# Patient Record
Sex: FEMALE | Race: WHITE | Employment: OTHER | URBAN - METROPOLITAN AREA
[De-identification: names, ages, dates, MRNs, and addresses within clinical notes are randomized per-mention and may not be internally consistent; named-entity substitution may affect disease eponyms.]

---

## 2017-01-26 ENCOUNTER — HOSPITAL ENCOUNTER (OUTPATIENT)
Dept: PHYSICAL THERAPY | Age: 82
Discharge: HOME | End: 2017-01-26
Payer: MEDICARE

## 2017-01-26 PROCEDURE — 97161 PT EVAL LOW COMPLEX 20 MIN: CPT

## 2017-01-26 PROCEDURE — 97110 THERAPEUTIC EXERCISES: CPT

## 2017-01-26 PROCEDURE — 97140 MANUAL THERAPY 1/> REGIONS: CPT

## 2017-01-26 PROCEDURE — G8988 SELF CARE GOAL STATUS: HCPCS

## 2017-01-26 PROCEDURE — G8987 SELF CARE CURRENT STATUS: HCPCS

## 2017-01-26 NOTE — PROGRESS NOTES
Cee Rosas 31  [facility]  [address], [address], [place], [address] [facility] - Phone: [phone]  Fax: [phone] / [address] [facility]  Patient Name: Mayelin Rg :    Treatment   Diagnosis: (R) TMD - anterior disc dysfunction Medical   Diagnosis: TMJ (dislocation of temporomandibular joint) [S03.00XA]   Onset Date: 2016     Referral Source: Iona Alberto MD [facility]): 2017   Prior Hospitalization: See medical history Provider #: [id number]   Prior Level of Function: No difficulty eating, chewing various foods, normal diet   Comorbidities: OA, skin cancer, D and C, breast reduction     Medications: Verified on Patient Summary List   The Plan of Care and following information is based on the information from the initial evaluation.   ==================================================================================  Assessment / key information:  Pt is a 79 yo female s/p increase in (R) TMJ s/s after dental procedure perform 2016 . He/she presents with pain ranging from 0-10/10, located (R) TMJ. Pain is made worse with opening mouth, yawning, chewing food, better with rest, meds. C/S AROM: flexion 40 deg, extension 25 deg,, LRotation 60 deg RRotation 60 deg. Palpation reveals TTP increased TTP to (R) temporalis, masseter, (R) TMD. Mandibular Depression 5mm, Lateral Deviation (L) 3 mm, (R) 2 mm. Sensation is intact to light touch. Decreased inferior and anterior translation of (R) mandibular condyle. Pt will benefit from PT interventions to address the aforementioned deficits and allow pt to return to PLOF.   Eval Complexity: History MEDIUM  Complexity : 1-2 comorbidities / personal factors will impact the outcome/ POC ;  Examination  LOW Complexity : 1-2 Standardized tests and measures addressing body structure, function, activity limitation and / or participation in recreation ; Presentation LOW Complexity : Stable, uncomplicated ;  Decision Making MEDIUM Complexity : FOTO score of 26-74; Overall Complexity LOW     ==================================================================================  Problem List: pain affecting function, decrease ROM and decrease flexibility/ joint mobility   Treatment Plan may include any combination of the following: Therapeutic exercise, Neuromuscular re-education, Physical agent/modality, Manual therapy and Patient education  Patient / Family readiness to learn indicated by: asking questions, trying to perform skills and interest  Persons(s) to be included in education: patient (P)  Barriers to Learning/Limitations: no  Measures taken:    Patient Goal (s): Decrease pain, increase ability to eat a variety of food   Patient self reported health status: good  Rehabilitation Potential: good   Short Term Goals: To be accomplished in  2  weeks:  1. Pt will be independent and compliant with HEP to decrease pain, increase ROM and return pt to PLOF. 2. Pt will note pain less than or equal to 5/10 at worst to allow increase in functional abilities. 3. Pt will demonstrate mandibular depression by 10mm to allow ability to chew foods    Long Term Goals: To be accomplished in  4  weeks:  1. Increase score on FOTO by > or = 12 points to demo increase in functional activities. 2. Pt will havepain-free AROM mandibular depression greater than 30mm to allow ability to return to normal diet  3. Pt will note < or = 2/10 pain with all mobility to improve comfort with ADLs. Frequency / Duration:   Patient to be seen  2  times per week for 4  weeks:  Patient / Caregiver education and instruction: self care, activity modification and exercises  G-Codes (GP): Self Care  Current  CK= 40-59%   Goal  CK= 40-59%.   The severity rating is based on the FOTO Score     Therapist Signature: Christian Yancey DPT, CIMT Date: 1/26/2017 Certification Period: 1/26/17-3/21/17 Time: 3:06 PM   ===========================================================================================  I certify that the above Physical Therapy Services are being furnished while the patient is under my care. I agree with the treatment plan and certify that this therapy is necessary. Physician Signature:        Date:       Time:     Please sign and return to [facility] or you may fax the signed copy to [phone]. Thank you.

## 2017-01-26 NOTE — PROGRESS NOTES
PHYSICAL THERAPY - DAILY TREATMENT NOTE    Patient Name: Alex Vasquez        Date: 2017  : 1929   YES Patient  Verified  Visit #:   1   of   8  Insurance: Payor: [place] MED[place]ARE / Plan: [place] MEDICARE PART A & B / Product Type: Medicare /      In time: 215 Out time: 300   Total Treatment Time: 45     Medicare Time Tracking (below)   Total Timed Codes (min):  45 1:1 Treatment Time:  45     TREATMENT AREA =  (R) TMJ - anterior disc dislocation    SUBJECTIVE    Pain Level (on 0 to 10 scale):  ie  / 10   Medication Changes/New allergies or changes in medical history, any new surgeries or procedures? NO    If yes, update Summary List   Subjective Functional Status/Changes:  []  No changes reported     See POC          OBJECTIVE  20 mins Manual therapy  - DTM to (R) temporalis, massester, contract relax for mandibular depression, (R) TMJ distraction anterior translation mobs    min Patient Education:  YES  Reviewed HEP   []  Progressed/Changed HEP based on: Other Objective/Functional Measures:    Shown and Performed HEP.      Post Treatment Pain Level (on 0 to 10) scale:   ie  / 10     ASSESSMENT    []  See Progress Note/Recertification   Patient will continue to benefit from skilled therapy to address remaining functional deficits: See POC   Progress toward goals / Updated goals:    See POC     PLAN    []  Upgrade activities as tolerated YES Continue plan of care   []  Discharge due to :    []  Other:      Therapist: Kalee Grover DPT, CIMT    Date: 2017 Time: 3:04 PM

## 2017-02-03 ENCOUNTER — HOSPITAL ENCOUNTER (OUTPATIENT)
Dept: PHYSICAL THERAPY | Age: 82
Discharge: HOME | End: 2017-02-03
Payer: MEDICARE

## 2017-02-03 PROCEDURE — 97110 THERAPEUTIC EXERCISES: CPT

## 2017-02-03 PROCEDURE — 97140 MANUAL THERAPY 1/> REGIONS: CPT

## 2017-02-03 NOTE — PROGRESS NOTES
PHYSICAL THERAPY - DAILY TREATMENT NOTE    Patient Name: Kaushik Yeboah        Date: 2/3/2017  : 1929   YES Patient  Verified  Visit #:   2   of   12  Insurance: Payor: [place] MEDICARE / Plan: VA MEDICARE PART A & B / Product Type: Medicare /      In time: 230 Out time: 305   Total Treatment Time: 35       TREATMENT AREA = TMJ (dislocation of temporomandibular joint) [S03.00XA]    SUBJECTIVE  Pain Level (on 0 to 10 scale):  0  / 10   Medication Changes/New allergies or changes in medical history, any new surgeries or procedures? NO    If yes, update Summary List   Subjective Functional Status/Changes:  []  No changes reported     Reports compliance with HEP for TMJ s/s          OBJECTIVE  Modalities Rationale:      to improve patient's ability to    min [] Estim, type/location:                                      []  att     []  unatt     []  w/US     []  w/ice    []  w/heat    min []  Mechanical Traction: type/lbs                   []  pro   []  sup   []  int   []  cont    []  before manual    []  after manual    min []  Ultrasound, settings/location:      min []  Iontophoresis w/ dexamethasone, location:                                               []  take home patch       []  in clinic    min []  Ice     []  Heat    location/position:     min []  Vasopneumatic Device, press/temp:     min []  Other:    [] Skin assessment post-treatment (if applicable):    []  intact    []  redness- no adverse reaction     []redness  adverse reaction:        10 min Therapeutic Exercise:  [x]  See flow sheet   Rationale:      increase ROM to improve the patients ability to chew food     25 min Manual Therapy: DTM to (R) temporalis, masseter, TMJ distraction tech, MFR to (R) temporomandibular region    Rationale:      decrease pain, increase ROM and increase tissue extensibility to improve patient's ability to chew food     min Patient Education:  YES  Reviewed HEP   []  Progressed/Changed HEP based on:         Other Objective/Functional Measures:    Demonstrates decrease mandibular depression to 3 mm at onset of visit with increase tonicity in (R) temporalis and masseter region    Able to perform 10mm of mandibular depression     Post Treatment Pain Level (on 0 to 10) scale:   0  / 10     ASSESSMENT  Assessment/Changes in Function:     Progression with mandibular depression and with decrease in (R) jaw s/s     []  See Progress Note/Recertification   Patient will continue to benefit from skilled PT services to modify and progress therapeutic interventions, address functional mobility deficits, address ROM deficits, address strength deficits and analyze and address soft tissue restrictions to attain remaining goals.    Progress toward goals / Updated goals:    Progression towards goals, will monitor to response to manual therapy next session     PLAN  []  Upgrade activities as tolerated YES Continue plan of care   []  Discharge due to :    []  Other:      Therapist: Jorje Louise DPT, CIMT    Date: 2/3/2017 Time: 3:16 PM       Future Appointments  Date Time Provider Lynne Sinclair   2/7/2017 2:00 PM Min Eugene, PT [facility]   2/9/2017 2:00 PM Min Eugene, PT [facility]   2/14/2017 2:00 PM Min Eugene, PT [facility]   2/16/2017 2:00 PM Min Eugene, PT [facility]   2/21/2017 2:30 PM Min Eugene, PT [facility]   2/23/2017 2:00 PM Min Eugene, PT [facility]

## 2017-02-07 ENCOUNTER — HOSPITAL ENCOUNTER (OUTPATIENT)
Dept: PHYSICAL THERAPY | Age: 82
Discharge: HOME | End: 2017-02-07
Payer: MEDICARE

## 2017-02-07 PROCEDURE — 97140 MANUAL THERAPY 1/> REGIONS: CPT

## 2017-02-07 PROCEDURE — 97110 THERAPEUTIC EXERCISES: CPT

## 2017-02-07 NOTE — PROGRESS NOTES
PHYSICAL THERAPY - DAILY TREATMENT NOTE    Patient Name: Cherelle Sexton        Date: 2017  : 1929   YES Patient  Verified  Visit #:   3   of   12  Insurance: Payor: Breann Ice / Plan: VA MEDICARE PART A & B / Product Type: Medicare /      In time: 200 Out time: 240   Total Treatment Time: 40     Medicare Time Tracking (below)   Total Timed Codes (min):  40 1:1 Treatment Time:  40     TREATMENT AREA = TMJ (dislocation of temporomandibular joint) [S03.00XA]    SUBJECTIVE  Pain Level (on 0 to 10 scale):  0  / 10   Medication Changes/New allergies or changes in medical history, any new surgeries or procedures?     NO    If yes, update Summary List   Subjective Functional Status/Changes:  []  No changes reported     Reports soreness (R) TMJ region at onset of visit, Reports feeling stiffness           OBJECTIVE    10 min Therapeutic Exercise:  [x]  See flow sheet   Rationale:      increase ROM to improve the patients ability to chew food     30 min Manual Therapy: DTM to (R) masseter, (R) temporalis, TMJ distraction technique, inferior/anterior glide, DTM to (R) medial pyterygoid   Rationale:      decrease pain, increase ROM and increase tissue extensibility to improve patient's ability to increase ability to chew food    Other Objective/Functional Measures:    Demonstrates soreness in (R) TMJ reigon with limited mandibular depression to 3mm at onset of visit     Post Treatment Pain Level (on 0 to 10) scale:   0  / 10     ASSESSMENT  Assessment/Changes in Function:     Slow progression with mandibular depression, able to demonstrates 10mm at end of treatment session      []  See Progress Note/Recertification   Patient will continue to benefit from skilled PT services to modify and progress therapeutic interventions, address functional mobility deficits, address ROM deficits, address strength deficits, analyze and address soft tissue restrictions and analyze and cue movement patterns to attain remaining goals.   Progress toward goals / Updated goals:    Progressing slowly with overall mobility will monitor and progress as able      PLAN  []  Upgrade activities as tolerated YES Continue plan of care   []  Discharge due to :    []  Other:      Therapist: Tammy Will DPT, CIMT    Date: 2/7/2017 Time: 1:34 PM       Future Appointments  Date Time Provider Lynne Sinclair   2/7/2017 2:00 PM Matthieu Blind, PT [facility]   2/9/2017 2:00 PM Matthieu Blind, PT [facility]   2/14/2017 2:00 PM Matthieu Blind, PT [facility]   2/16/2017 2:00 PM Matthieu Blind, PT [facility]   2/21/2017 2:30 PM Matthieu Blind, PT [facility]   2/23/2017 2:00 PM Matthieu Blind, PT [facility]

## 2017-02-09 ENCOUNTER — HOSPITAL ENCOUNTER (OUTPATIENT)
Dept: PHYSICAL THERAPY | Age: 82
Discharge: HOME | End: 2017-02-09
Payer: MEDICARE

## 2017-02-09 PROCEDURE — 97124 MASSAGE THERAPY: CPT

## 2017-02-09 PROCEDURE — 97140 MANUAL THERAPY 1/> REGIONS: CPT

## 2017-02-09 NOTE — PROGRESS NOTES
PHYSICAL THERAPY - DAILY TREATMENT NOTE    Patient Name: Mindy Miner        Date: 2017  : 1929   YES Patient  Verified  Visit #:   4   of   12  Insurance: Payor: Roberto Many / Plan: [place] MEDICARE PART A & B / Product Type: Medicare /      In time: 200 Out time: 230   Total Treatment Time: 30     Medicare Time Tracking (below)   Total Timed Codes (min):  30 1:1 Treatment Time:  30     TREATMENT AREA = TMJ (dislocation of temporomandibular joint) [S03.00XA]    SUBJECTIVE  Pain Level (on 0 to 10 scale):  0  / 10   Medication Changes/New allergies or changes in medical history, any new surgeries or procedures? NO    If yes, update Summary List   Subjective Functional Status/Changes:  []  No changes reported     Reports decrease in stiffness after last visit, mild reports of crepitus with mandibular protrusion exercises           OBJECTIVE    [] Skin assessment post-treatment (if applicable):    []  intact    []  redness- no adverse reaction     []redness  adverse reaction:        30 min Manual Therapy: DTM to (R) masseter, MFR to (R) TMD, f/b intraoral release of (R) medial pterygoid, joint mobs for inferior glide, anterior translation of (R) TMD   Rationale:      decrease pain, increase ROM and increase tissue extensibility to improve patient's ability to perform ADLs     min Patient Education:  YES  Reviewed HEP   []  Progressed/Changed HEP based on: Other Objective/Functional Measures:    Cavitation noted with use of inferior glide, anterior translation mob this visit, demonstrates 5mm of mandibular depression at onset of visit, able to perform 15mm after manual therapy     Post Treatment Pain Level (on 0 to 10) scale:   0  / 10     ASSESSMENT  Assessment/Changes in Function:     Progressing with overall mandibular depression.  Pt advised to con't with mandibular depression, lateral deviation and protrusion exercises over weekend     []  See Progress Note/Recertification   Patient will continue to benefit from skilled PT services to modify and progress therapeutic interventions, address functional mobility deficits, address ROM deficits and address strength deficits to attain remaining goals. Progress toward goals / Updated goals:     Will monitor and progress as able      PLAN  []  Upgrade activities as tolerated YES Continue plan of care   []  Discharge due to :    []  Other:      Therapist: Jorje Louise DPT, CIMT    Date: 2/9/2017 Time: 2:34 PM       Future Appointments  Date Time Provider Lynne Sinclair   2/14/2017 2:00 PM Min Eugene, PT [facility]   2/16/2017 2:00 PM Min Eugene, PT [facility]   2/21/2017 2:30 PM Min Eugene, PT [facility]   2/23/2017 2:00 PM Min Eugene, PT [facility]

## 2017-02-14 ENCOUNTER — HOSPITAL ENCOUNTER (OUTPATIENT)
Dept: PHYSICAL THERAPY | Age: 82
Discharge: HOME | End: 2017-02-14
Payer: MEDICARE

## 2017-02-14 PROCEDURE — 97140 MANUAL THERAPY 1/> REGIONS: CPT

## 2017-02-14 PROCEDURE — 97110 THERAPEUTIC EXERCISES: CPT

## 2017-02-14 NOTE — PROGRESS NOTES
PHYSICAL THERAPY - DAILY TREATMENT NOTE    Patient Name: Ricky Lares        Date: 2017  : 1929   YES Patient  Verified  Visit #:     Insurance: Payor: Deja Smalls / Plan: VA MEDICARE PART A & B / Product Type: Medicare /      In time: 200 Out time: 230   Total Treatment Time: 30     Medicare Time Tracking (below)   Total Timed Codes (min):  30 1:1 Treatment Time:  30     TREATMENT AREA = TMJ (dislocation of temporomandibular joint) [S03.00XA]    SUBJECTIVE  Pain Level (on 0 to 10 scale):  0  / 10   Medication Changes/New allergies or changes in medical history, any new surgeries or procedures? NO    If yes, update Summary List   Subjective Functional Status/Changes:  []  No changes reported   Reports reduction in pain levels with mild reports of clicking since last session with use of HEP, states she has been able to each a variety of food type since last session including chicken and veal - previously unable to eat these foods due to limited opening         OBJECTIVE    30 min Manual Therapy: DTM to (R) masseter, medial pterygoid, TMJ distraction mobs, inferior glide with anterior translation mobs   Rationale:      decrease pain, increase ROM and increase tissue extensibility to improve patient's ability to perform ADLs     min Patient Education:  YES  Reviewed HEP   []  Progressed/Changed HEP based on:         Other Objective/Functional Measures:    Demonstrates 5mm of mandibular depression at onset of visit, with significant hypertonicity in (R) medial pterygoid and masseter    Demonstrates 12mm of mandibular depression at manual therapy techniques      Post Treatment Pain Level (on 0 to 10) scale:   0  / 10     ASSESSMENT  Assessment/Changes in Function:     Required verbal cues for use of exercise to decrease lingual compensations and perform techniques appropriately     []  See Progress Note/Recertification   Patient will continue to benefit from skilled PT services to modify and progress therapeutic interventions, address functional mobility deficits, address ROM deficits, address strength deficits, analyze and address soft tissue restrictions and analyze and cue movement patterns to attain remaining goals. Progress toward goals / Updated goals:    Progressing with overall function.  Will monitor and progress as able      PLAN  []  Upgrade activities as tolerated YES Continue plan of care   []  Discharge due to :    []  Other:      Therapist: Shasha Montgomery DPT, CIMT    Date: 2/14/2017 Time: 2:42 PM       Future Appointments  Date Time Provider Lynne Sinclair   2/16/2017 2:00 PM Erick Miu, PT [facility]   2/21/2017 2:30 PM Erick Miu, PT [facility]   2/23/2017 2:00 PM Erick Miu, PT [facility]

## 2017-02-16 ENCOUNTER — HOSPITAL ENCOUNTER (OUTPATIENT)
Dept: PHYSICAL THERAPY | Age: 82
Discharge: HOME | End: 2017-02-16
Payer: MEDICARE

## 2017-02-16 PROCEDURE — 97140 MANUAL THERAPY 1/> REGIONS: CPT

## 2017-02-16 NOTE — PROGRESS NOTES
PHYSICAL THERAPY - DAILY TREATMENT NOTE    Patient Name: Joy Aponte        Date: 2017  : 1929   YES Patient  Verified  Visit #:   6   of   12  Insurance: Payor: Rakesh Signs / Plan: [place] MEDICARE PART A & B / Product Type: Medicare /      In time: 200 Out time: 230   Total Treatment Time: 30     Medicare Time Tracking (below)   Total Timed Codes (min):  30 1:1 Treatment Time:  30     TREATMENT AREA = TMJ (dislocation of temporomandibular joint) [S03.00XA]    SUBJECTIVE  Pain Level (on 0 to 10 scale):  0  / 10   Medication Changes/New allergies or changes in medical history, any new surgeries or procedures?     NO    If yes, update Summary List   Subjective Functional Status/Changes:  []  No changes reported     Reports soreness in (R) TMD region with limited mandibular depression           OBJECTIVE  Modalities Rationale:      to improve patient's ability to    min [] Estim, type/location:                                      []  att     []  unatt     []  w/US     []  w/ice    []  w/heat    min []  Mechanical Traction: type/lbs                   []  pro   []  sup   []  int   []  cont    []  before manual    []  after manual    min []  Ultrasound, settings/location:      min []  Iontophoresis w/ dexamethasone, location:                                               []  take home patch       []  in clinic    min []  Ice     []  Heat    location/position:     min []  Vasopneumatic Device, press/temp:     min []  Other:    [] Skin assessment post-treatment (if applicable):    []  intact    []  redness- no adverse reaction     []redness  adverse reaction:        30 Min Manual Therapy: DTM to (R) temporalis, masseter, medial pterygoid, MFR to (R) TMD, (R) inferior glides to (R) TMD, anterior glide for mandibular depression   Rationale:      decrease pain, increase ROM and increase tissue extensibility to improve patient's ability to perform ADLs     min Patient Education:  YES  Reviewed HEP   [] Progressed/Changed HEP based on: Other Objective/Functional Measures:    Reports soreness with palpation to (R) masseter, medial pterygoid, temporalis, demonstrates limited mandibular depression to 3mm increase to 15 mm with distraction and inferior glide of TMD     Post Treatment Pain Level (on 0 to 10) scale:   0  / 10     ASSESSMENT  Assessment/Changes in Function:     Con't with limited opening of mouth with decrease in mandibular depression     []  See Progress Note/Recertification   Patient will continue to benefit from skilled PT services to modify and progress therapeutic interventions, address functional mobility deficits, address ROM deficits, address strength deficits and analyze and address soft tissue restrictions to attain remaining goals.    Progress toward goals / Updated goals:    Progressing slowly with mandibular depression, reassess FOTO next visit      PLAN  []  Upgrade activities as tolerated YES Continue plan of care   []  Discharge due to :    []  Other:      Therapist: Dena Alejandre DPT, CIMT    Date: 2/16/2017 Time: 2:38 PM       Future Appointments  Date Time Provider Lynne Sinclair   2/21/2017 2:30 PM Roberto Gao PT [address]   2/23/2017 2:00 PM Roberto Gao PT [address]

## 2017-02-21 ENCOUNTER — HOSPITAL ENCOUNTER (OUTPATIENT)
Dept: PHYSICAL THERAPY | Age: 82
Discharge: HOME | End: 2017-02-21
Payer: MEDICARE

## 2017-02-21 PROCEDURE — 97140 MANUAL THERAPY 1/> REGIONS: CPT

## 2017-02-21 NOTE — PROGRESS NOTES
PHYSICAL THERAPY - DAILY TREATMENT NOTE    Patient Name: Nora Ha        Date: 2017  : 1929   YES Patient  Verified  Visit #:     Insurance: Payor: [place] MEDICARE / Plan: VA MEDICARE PART A & B / Product Type: Medicare /      In time: 215 Out time: 250   Total Treatment Time: 35     Medicare Time Tracking (below)   Total Timed Codes (min):  35 1:1 Treatment Time:  35     TREATMENT AREA = TMJ (dislocation of temporomandibular joint) [S03.00XA]    SUBJECTIVE  Pain Level (on 0 to 10 scale):  0  / 10   Medication Changes/New allergies or changes in medical history, any new surgeries or procedures? NO    If yes, update Summary List   Subjective Functional Status/Changes:  []  No changes reported     Reports con't difficulty with opening jaw during ADL and meals          OBJECTIVE    35 min Manual Therapy: DTM to (R) masseter, temporalis, mandibular traction/distraction techniques, f/b anterior inferior glide for mandibular depression   Rationale:      decrease pain, increase ROM and increase tissue extensibility to improve patient's ability to perform ADLs     min Patient Education:  YES  Reviewed HEP   []  Progressed/Changed HEP based on: Other Objective/Functional Measures:    Con't with hypertonicity in (R) masseter, temporalis, decreased mandibular depression to 5mm at onset or visit, able to increase to 12mm at end of treatment session, with con't hypertonicity      Post Treatment Pain Level (on 0 to 10) scale:   0  / 10     ASSESSMENT  Assessment/Changes in Function:     Progressing slowly with mandibular depression      []  See Progress Note/Recertification   Patient will continue to benefit from skilled PT services to modify and progress therapeutic interventions, address functional mobility deficits, address ROM deficits, address strength deficits and analyze and address soft tissue restrictions to attain remaining goals.    Progress toward goals / Updated goals:    No changes towards goals this visit       PLAN  []  Upgrade activities as tolerated YES Continue plan of care   []  Discharge due to :    []  Other:      Therapist: Terrance Otoole DPT, CIMT    Date: 2/21/2017 Time: 2:53 PM       Future Appointments  Date Time Provider Lynne Sinclair   2/23/2017 2:00 PM Sena Massey, PT [facility]

## 2017-02-23 ENCOUNTER — HOSPITAL ENCOUNTER (OUTPATIENT)
Dept: PHYSICAL THERAPY | Age: 82
Discharge: HOME | End: 2017-02-23
Payer: MEDICARE

## 2017-02-23 PROCEDURE — 97140 MANUAL THERAPY 1/> REGIONS: CPT

## 2017-02-23 NOTE — PROGRESS NOTES
[address]  [facility] PHYSICAL THERAPY  [address], [place], [address] - Phone: [phone]  Fax: [phone] OF CARE/RECERTIFICATION FOR PHYSICAL THERAPY          Patient Name: Patsy Valera :    Medical/Treatment Diagnosis: TMJ (dislocation of temporomandibular joint) [S03.00XA]   Onset Date: 2016    Referral Source: Bob Cleveland MD Start of Replaced by [facility]): 17   Prior Hospitalization: See Medical History Provider #: [id number]   Prior Level of Function: No difficulty eating, chewing various foods, normal diet   Comorbidities: OA, skin cancer, D and C, breast reduction   Medications: Verified on Patient Summary List   Visits from [facility]: 8 Missed Visits:      Goal/Measure of Progress Goal Met?   1.  1. Increase score on FOTO by > or = 12 points to demo increase in functional activities. Status at last Eval: 46 Current Status: na n/a   2.  2. Pt will havepain-free AROM mandibular depression greater than 30mm to allow ability to return to normal diet   Status at last Eval: 5mm Current Status: 15mm progressing   3.  3. Pt will note < or = 2/10 pain with all mobility to improve comfort with ADLs. Status at last Eval: 0-10/10 Current Status: 0/10 yes     Key Functional Changes/Progress: Con't with trismus and decrease in mandibular depression to 15mm. Con't with increase in mm guarding to (R) temporalis, masseter and pterygoid. Overall reports decrease in pain and improved ability to eat a variety of foods.    Problem List:  Decreased mandibular opening, increased trismus, increased hypertonicity of mastication musculature    Treatment Plan may include any combination of the following: Therapeutic exercise, Therapeutic activities, Neuromuscular re-education, Physical agent/modality, Manual therapy and Patient education  Patient Goal(s) has been updated and includes:      Goals for this certification period include and are to be achieved in   2-3  weeks:  Con't with goals as noted above, goals 1-2  Frequency / Duration:   Patient to be seen   2-3   times per week for   3    weeks:  G-Codes (GP): Self Care  Current  CK= 40-59%   Goal  CK= 40-59%. The severity rating is based on the FOTO Score    Assessments/Recommendations: Con't with PT services 2-3x week for 3 weeks. If you have any questions/comments please contact us directly at [phone]. Thank you for allowing us to assist in the care of your patient. Therapist Signature: Waleska BARLOWT, CIMT Date: 3/61/6016   Certification Period:  Reporting Period: 1/26/17-3/2/17  1/26/17-2/23/17 Time: 2:54 PM   NOTE TO PHYSICIAN:  PLEASE COMPLETE THE ORDERS BELOW AND FAX TO   [facility] Physical Therapy: [phone]  If you are unable to process this request in 24 hours please contact our office: [phone]    ___ I have read the above report and request that my patient continue as recommended.   ___ I have read the above report and request that my patient continue therapy with the following changes/special instructions: ________________________________________________   ___ I have read the above report and request that my patient be discharged from therapy.      Physician Signature:        Date:       Time:

## 2017-02-23 NOTE — PROGRESS NOTES
PHYSICAL THERAPY - DAILY TREATMENT NOTE    Patient Name: Charly Henderson        Date: 2017  : 1929   YES Patient  Verified  Visit #:     Insurance: Payor: Eitan Honey / Plan: [place] MED[place] PART A & B / Product Type: Medicare /      In time: 200 Out time: 230   Total Treatment Time: 30     Medicare Time Tracking (below)   Total Timed Codes (min):  30 1:1 Treatment Time:  30     TREATMENT AREA = TMJ (dislocation of temporomandibular joint) [S03.00XA]    SUBJECTIVE  Pain Level (on 0 to 10 scale):  0  / 10   Medication Changes/New allergies or changes in medical history, any new surgeries or procedures? NO    If yes, update Summary List   Subjective Functional Status/Changes:  []  No changes reported     Reports decrease in overall pain levels, con't with limited mandibular depression to 5mm at onset of visit           OBJECTIVE    30 min Manual Therapy: DTM to (R) TMJ, (R) TMD distraction techniques, inferior glide with anterior translation   Rationale:      decrease pain, increase ROM and increase tissue extensibility to improve patient's ability to perform ADLs     min Patient Education:  YES  Reviewed HEP   []  Progressed/Changed HEP based on: Other Objective/Functional Measures:    Demonstrates soreness to (R) medial pterygoid, masseter and temporalis     Post Treatment Pain Level (on 0 to 10) scale:   0  / 10     ASSESSMENT  Assessment/Changes in Function:     Cont with limited mandibular depression with limited opening to 15mm at end of treatment session will discuss POC with oral surgeon     []  See Progress Note/Recertification   Patient will continue to benefit from skilled PT services to modify and progress therapeutic interventions, address functional mobility deficits, address ROM deficits, address strength deficits, analyze and address soft tissue restrictions and analyze and cue movement patterns to attain remaining goals.    Progress toward goals / Updated goals:    Slow progress with goals, reassess FOTO score next session      PLAN  []  Upgrade activities as tolerated YES Continue plan of care   []  Discharge due to :    []  Other:      Therapist: Liang Gonsales DPT, CIMT    Date: 2/23/2017 Time: 2:26 PM       No future appointments.

## 2017-03-03 ENCOUNTER — HOSPITAL ENCOUNTER (OUTPATIENT)
Dept: PHYSICAL THERAPY | Age: 82
Discharge: HOME | End: 2017-03-03
Payer: MEDICARE

## 2017-03-03 PROCEDURE — 97140 MANUAL THERAPY 1/> REGIONS: CPT

## 2017-03-03 NOTE — PROGRESS NOTES
PHYSICAL THERAPY - DAILY TREATMENT NOTE    Patient Name: Stefan Crain        Date: 3/3/2017  : 1929   YES Patient  Verified  Visit #:   9   of   10  Insurance: Payor: Brittney Ferrera / Plan: [place] MEDICARE PART A & B / Product Type: Medicare /      In time: 330 Out time: 405   Total Treatment Time: 35     Medicare Time Tracking (below)   Total Timed Codes (min):  35 1:1 Treatment Time:  35     TREATMENT AREA = TMJ (dislocation of temporomandibular joint) [S03.00XA]    SUBJECTIVE  Pain Level (on 0 to 10 scale):  0  / 10   Medication Changes/New allergies or changes in medical history, any new surgeries or procedures? NO    If yes, update Summary List   Subjective Functional Status/Changes:  []  No changes reported     Presents to therapy with thera-bit trismus device     States she purchased on over the break await for con't PT services           OBJECTIVE    35 min Manual Therapy: DTM to (R) masseter, temporalis, MFR to (R) TMD, intraoral release of (R) masseter, medial pterygoid, f/b mandibular depression mobs for inferior glide and anterior glide    Rationale:      decrease pain, increase ROM and increase tissue extensibility to improve patient's ability to increase ability to chew foods     min Patient Education:  YES  Reviewed HEP   []  Progressed/Changed HEP based on:         Other Objective/Functional Measures:    Demonstrates increased hypertonicity with TPs in (R) facial mm with limited mandibular depression to 5 mm at onset of visit, able to increase mandibular depression to approx 20mm post treatment session    Pt was educated in use of Thera-Bite Trismus device, use of device 1 hourly with 10\"x10 reps of exercises, self massage and use of modalities for pain control      Post Treatment Pain Level (on 0 to 10) scale:   0  / 10     ASSESSMENT  Assessment/Changes in Function:     Progressing with overall mobility, understood use of device, along with precautions post treatment with return demonstrates of use and verbalization of understanding      []  See Progress Note/Recertification   Patient will continue to benefit from skilled PT services to modify and progress therapeutic interventions, address functional mobility deficits, address ROM deficits, address strength deficits and analyze and address soft tissue restrictions to attain remaining goals. Progress toward goals / Updated goals:     Will monitor and progress as able      PLAN  []  Upgrade activities as tolerated YES Continue plan of care   []  Discharge due to :    []  Other:      Therapist: Suzie Solomon DPT, CIMT    Date: 3/3/2017 Time: 4:11 PM       Future Appointments  Date Time Provider Lynne Sinclair   3/7/2017 2:30 PM Marisela Cheatham, PT [facility]   3/9/2017 2:30 PM Marisela Cheatham, PT [facility]   3/14/2017 2:30 PM Marisela Cheatham, PT [facility]   3/16/2017 2:30 PM Marisela Cheatham, PT [facility]

## 2017-03-07 ENCOUNTER — HOSPITAL ENCOUNTER (OUTPATIENT)
Dept: PHYSICAL THERAPY | Age: 82
Discharge: HOME | End: 2017-03-07
Payer: MEDICARE

## 2017-03-07 PROCEDURE — 97140 MANUAL THERAPY 1/> REGIONS: CPT

## 2017-03-07 PROCEDURE — 97124 MASSAGE THERAPY: CPT

## 2017-03-07 NOTE — PROGRESS NOTES
PHYSICAL THERAPY - DAILY TREATMENT NOTE    Patient Name: Edmund Valera        Date: 3/7/2017  : 1929   YES Patient  Verified  Visit #:   10   of   12  Insurance: Payor: Dagoberto Zapata / Plan: [place] MEDICARE PART A & B / Product Type: Medicare /      In time: 230 Out time: 300   Total Treatment Time: 30     Medicare Time Tracking (below)   Total Timed Codes (min):  30 1:1 Treatment Time:  30     TREATMENT AREA = Right temporomandibular joint disorder, unspecified [M26.601]    SUBJECTIVE  Pain Level (on 0 to 10 scale):  0  / 10   Medication Changes/New allergies or changes in medical history, any new surgeries or procedures? NO    If yes, update Summary List   Subjective Functional Status/Changes:  []  No changes reported     Reports soreness in (R) TMD region           OBJECTIVE    30 min Manual Therapy: Medial pterygoid, mandibular depression mobs, inferior glide with anterior translation mobs to (R) TMD   Rationale:      decrease pain, increase ROM and increase tissue extensibility to improve patient's ability to perform ADLs     min Patient Education:  YES  Reviewed HEP   []  Progressed/Changed HEP based on: Other Objective/Functional Measures:    con't with increase ttp to medial pterygoid with limited mandibular depression to 10mm at onset of visit, increase mandibular depression to 25mm after manual therapy and use of thera-bite device      Post Treatment Pain Level (on 0 to 10) scale:   0  / 10     ASSESSMENT  Assessment/Changes in Function:     Progressing with overall mobility of TMD along with self reports of ability to use therabite to increase depression at home     []  See Progress Note/Recertification   Patient will continue to benefit from skilled PT services to modify and progress therapeutic interventions, address functional mobility deficits, address ROM deficits, address strength deficits and analyze and address soft tissue restrictions to attain remaining goals.    Progress toward goals / Updated goals:    Progressing with overall function      PLAN  []  Upgrade activities as tolerated YES Continue plan of care   []  Discharge due to :    []  Other:      Therapist: Margot Perrin DPT, CIMT    Date: 3/7/2017 Time: 3:17 PM       Future Appointments  Date Time Provider Lynne Sinclair   3/9/2017 2:30 PM Justyn Amaro, PT [address]   3/14/2017 2:30 PM Justyn Amaro, PT [address]   3/16/2017 2:30 PM Justyn Amaro, PT [address]

## 2017-03-09 ENCOUNTER — HOSPITAL ENCOUNTER (OUTPATIENT)
Dept: PHYSICAL THERAPY | Age: 82
Discharge: HOME | End: 2017-03-09
Payer: MEDICARE

## 2017-03-09 PROCEDURE — 97140 MANUAL THERAPY 1/> REGIONS: CPT

## 2017-03-09 NOTE — PROGRESS NOTES
PHYSICAL THERAPY - DAILY TREATMENT NOTE    Patient Name: Vazquez Seo        Date: 3/9/2017  : 1929   YES Patient  Verified  Visit #:     Insurance: Payor: Mert Ruiz / Plan: [place] MEDICARE PART A & B / Product Type: Medicare /      In time: 235 Out time: 315   Total Treatment Time: 40     Medicare Time Tracking (below)   Total Timed Codes (min):  40 1:1 Treatment Time:  40     TREATMENT AREA = Right temporomandibular joint disorder, unspecified [M26.601]    SUBJECTIVE  Pain Level (on 0 to 10 scale):  0  / 10   Medication Changes/New allergies or changes in medical history, any new surgeries or procedures? NO    If yes, update Summary List   Subjective Functional Status/Changes:  []  No changes reported     Reports use of thera-bite since last session,          OBJECTIVE    40 min Manual Therapy: DTM to medial pterygoid, masseter, inferior glide to (R) TMD, (R) inferior glide with anterior translation   Rationale:      decrease pain, increase ROM and increase tissue extensibility to improve patient's ability to chew variety of food     min Patient Education:  YES  Reviewed HEP   []  Progressed/Changed HEP based on: Other Objective/Functional Measures:    Demonstrates increase in mandibular depression to 20mm      Post Treatment Pain Level (on 0 to 10) scale:   0  / 10     ASSESSMENT  Assessment/Changes in Function:     Progressing with overall function FOTO 72 pt, GROC 5+     []  See Progress Note/Recertification   Patient will continue to benefit from skilled PT services to modify and progress therapeutic interventions, address functional mobility deficits, address ROM deficits, address strength deficits, analyze and address soft tissue restrictions and analyze and cue movement patterns to attain remaining goals.    Progress toward goals / Updated goals:    Progressing with overall function, See MD progress note     PLAN  []  Upgrade activities as tolerated YES Continue plan of care []  Discharge due to :    []  Other:      Therapist: oJse Brown DPT, CIMT    Date: 3/9/2017 Time: 4:04 PM       Future Appointments  Date Time Provider Lynne Sinclair   3/14/2017 2:30 PM Annabelle Peters, PT [facility]   3/16/2017 2:30 PM Annabelle Peters, PT [facility]

## 2017-03-14 ENCOUNTER — APPOINTMENT (OUTPATIENT)
Dept: PHYSICAL THERAPY | Age: 82
End: 2017-03-14
Payer: MEDICARE

## 2017-03-16 ENCOUNTER — APPOINTMENT (OUTPATIENT)
Dept: PHYSICAL THERAPY | Age: 82
End: 2017-03-16
Payer: MEDICARE

## 2017-03-28 ENCOUNTER — HOSPITAL ENCOUNTER (OUTPATIENT)
Dept: PHYSICAL THERAPY | Age: 82
Discharge: HOME | End: 2017-03-28
Payer: MEDICARE

## 2017-03-28 PROCEDURE — 97110 THERAPEUTIC EXERCISES: CPT

## 2017-03-28 NOTE — PROGRESS NOTES
PHYSICAL THERAPY - DAILY TREATMENT NOTE    Patient Name: Jeanenne Blizzard        Date: 3/28/2017  : 1929   YES Patient  Verified  Visit #:     Insurance: Payor: Gayle Mueller / Plan: [place] MEDICARE PART A & B / Product Type: Medicare /      In time: 200 Out time: 230   Total Treatment Time: 30     Medicare Time Tracking (below)   Total Timed Codes (min):  30 1:1 Treatment Time:  30     TREATMENT AREA = Right temporomandibular joint disorder, unspecified [M26.601]    SUBJECTIVE  Pain Level (on 0 to 10 scale):  0  / 10   Medication Changes/New allergies or changes in medical history, any new surgeries or procedures? NO    If yes, update Summary List   Subjective Functional Status/Changes:  []  No changes reported     Reports soreness in (R) masseter and temporalis region at onset of visit           OBJECTIVE  Modalities Rationale:      to improve patient's ability to    min [] Estim, type/location:                                      []  att     []  unatt     []  w/US     []  w/ice    []  w/heat    min []  Mechanical Traction: type/lbs                   []  pro   []  sup   []  int   []  cont    []  before manual    []  after manual    min []  Ultrasound, settings/location:      min []  Iontophoresis w/ dexamethasone, location:                                               []  take home patch       []  in clinic    min []  Ice     []  Heat    location/position:     min []  Vasopneumatic Device, press/temp:     min []  Other:    [] Skin assessment post-treatment (if applicable):    []  intact    []  redness- no adverse reaction     []redness  adverse reaction:        30 Min Manual Therapy: DTM to (R) masseter, temporalis, medial pterygoid, inferior glide, anterior hinge of (R) TMD   Rationale:      decrease pain, increase ROM and increase tissue extensibility to improve patient's ability to perform ADLs     min Patient Education:  YES  Reviewed HEP   []  Progressed/Changed HEP based on:         Other Objective/Functional Measures:    Demonstrates increased crepitus in (R) TMD region with distraction technique glide to (R) TMD     Post Treatment Pain Level (on 0 to 10) scale:   0  / 10     ASSESSMENT  Assessment/Changes in Function:     Slow progression with overall mandibular depression that varies between 10mm-20mm during therapy     []  See Progress Note/Recertification   Patient will continue to benefit from skilled PT services to modify and progress therapeutic interventions, address functional mobility deficits, address ROM deficits, address strength deficits, analyze and address soft tissue restrictions and analyze and cue movement patterns to attain remaining goals. Progress toward goals / Updated goals:     Will monitor and progress as able, pt to MD with status report     PLAN  []  Upgrade activities as tolerated YES Continue plan of care   []  Discharge due to :    []  Other:      Therapist: Connie Chandra DPT, CIMT    Date: 3/28/2017 Time: 1:53 PM       Future Appointments  Date Time Provider Lynne Sinclair   3/28/2017 2:00 PM Driss Kaur PT [id number] [facility]

## 2017-03-28 NOTE — PROGRESS NOTES
[address]  [facility] PHYSICAL THERAPY  [address] , [facility], [address] [place] [address] - Phone: [phone]  Fax: [phone] OF CARE/RECERTIFICATION FOR PHYSICAL THERAPY          Patient Name: King Pavon :    Medical/Treatment Diagnosis: Right temporomandibular joint disorder, unspecified [M26.601]   Onset Date: 2016    Referral Source: Iveth Cuello MD Start of Care [facility] 17   Prior Hospitalization: See Medical History Provider #: [id number]   Prior Level of Function: No difficulty eating, chewing various foods, normal diet   Comorbidities: OA, Skin Cancer, D and C, Breast Reduction    Medications: Verified on Patient Summary List   Visits from [facility]: 12 Missed Visits:      Goal/Measure of Progress Goal Met? 1. Increase score on FOTO by > or = 12 points to demo increase in functional activities   Status at last Eval: 46 Current Status: 72 yes   2. Pt will havepain-free AROM mandibular depression greater than 30mm to allow ability to return to normal diet   Status at last Eval: 15mm Current Status: 20mm yes     Key Functional Changes/Progress: Con't with trismus and decrease in overall mandibular depression. Mandibular depression has increased to 20mm from 5mm at onset of PT. Denotes crepitus with mandibular depression on (R) with increased tone in (R) masseter, temporalis and medial pterygoid. Reports current use of Thera-bite Trismus System for self management of s/s.    Problem List: Decreased mandibular opening, increased trismus, increased hypertonicity of mastication musculature   Treatment Plan may include any combination of the following: Therapeutic exercise, Therapeutic activities, Physical agent/modality, Manual therapy and Patient education  Patient Goal(s) has been updated and includes:      Goals for this certification period include and are to be achieved in   2-3  treatments:  Con't with goal 2   Frequency / Duration:   Patient to be seen   1   times per week for   3    weeks:  G-Codes (GP): Self Care  Current  CJ= 20-39%   Goal  CK= 40-59%. The severity rating is based on the FOTO Score    Assessments/Recommendations: Con't PT services 2-3 visit following crown placement for instruction in HEP for self mobilization techniques   If you have any questions/comments please contact us directly at [phone]. Thank you for allowing us to assist in the care of your patient. Therapist Signature: Luís Mari DPT, CIMT Date: 7/47/8198   Certification Period:  Reporting Period: 3/2/17-6/2/17 2/23/17-3/28/17 Time: 2:37 PM   NOTE TO PHYSICIAN:  PLEASE COMPLETE THE ORDERS BELOW AND FAX TO   [facility] Physical Therapy: [phone]  If you are unable to process this request in 24 hours please contact our office: [phone]    ___ I have read the above report and request that my patient continue as recommended.   ___ I have read the above report and request that my patient continue therapy with the following changes/special instructions: ________________________________________________   ___ I have read the above report and request that my patient be discharged from therapy.      Physician Signature:        Date:       Time:

## 2017-04-04 ENCOUNTER — HOSPITAL ENCOUNTER (OUTPATIENT)
Dept: PHYSICAL THERAPY | Age: 82
Discharge: HOME | End: 2017-04-04
Payer: MEDICARE

## 2017-04-04 PROCEDURE — 97140 MANUAL THERAPY 1/> REGIONS: CPT

## 2017-04-04 NOTE — PROGRESS NOTES
PHYSICAL THERAPY - DAILY TREATMENT NOTE    Patient Name: Pñea Gaspar        Date: 2017  : 1929   YES Patient  Verified  Visit #:   15   of   15  Insurance: Payor: Richard Romance / Plan: [place] MEDICARE PART A & B / Product Type: Medicare /      In time: 155 Out time: 225   Total Treatment Time: 30     Medicare Time Tracking (below)   Total Timed Codes (min):  30 1:1 Treatment Time:  30     TREATMENT AREA = Right temporomandibular joint disorder, unspecified [M26.601]    SUBJECTIVE  Pain Level (on 0 to 10 scale):  0  / 10   Medication Changes/New allergies or changes in medical history, any new surgeries or procedures?     NO    If yes, update Summary List   Subjective Functional Status/Changes:  []  No changes reported     Reports ability to go see Dentist since last visit for crown work without increase in s/s          OBJECTIVE  Modalities Rationale:      to improve patient's ability to    min [] Estim, type/location:                                      []  att     []  unatt     []  w/US     []  w/ice    []  w/heat    min []  Mechanical Traction: type/lbs                   []  pro   []  sup   []  int   []  cont    []  before manual    []  after manual    min []  Ultrasound, settings/location:      min []  Iontophoresis w/ dexamethasone, location:                                               []  take home patch       []  in clinic    min []  Ice     []  Heat    location/position:     min []  Vasopneumatic Device, press/temp:     min []  Other:    [] Skin assessment post-treatment (if applicable):    []  intact    []  redness- no adverse reaction     []redness - adverse reaction:        30 min Manual Therapy: DTM to masseter, medial pterygoid, (R) TMD distraction, anterior/infeiror glide of (R) mandibile   Rationale:      decrease pain, increase ROM and increase tissue extensibility to improve patient's ability to chew food     min Patient Education:  YES  Reviewed HEP   []  Progressed/Changed HEP based on:        Other Objective/Functional Measures:    Con't with mild hypertonicity with TPs in (R) mastication mm, limited mandibular depression from 10mm to 30mm with use of technique     Post Treatment Pain Level (on 0 to 10) scale:   0  / 10     ASSESSMENT  Assessment/Changes in Function:     Progressing with overall function and ability to chew food      []  See Progress Note/Recertification   Patient will continue to benefit from skilled PT services to modify and progress therapeutic interventions, address functional mobility deficits and address ROM deficits to attain remaining goals. Progress toward goals / Updated goals:     Will see patient 1x week for next 3 weeks to progress towards HEP     PLAN  []  Upgrade activities as tolerated YES Continue plan of care   []  Discharge due to :    []  Other:      Therapist: Arnaldo Bernard DPT, CIMT    Date: 4/4/2017 Time: 2:25 PM       Future Appointments  Date Time Provider Lynne Sinclair   4/14/2017 2:30 PM Ariana Faster, PT [facility]   4/20/2017 2:00 PM Ariana Faster, PT [facility]   4/27/2017 2:00 PM Ariana Faster, PT [facility]

## 2017-04-14 ENCOUNTER — HOSPITAL ENCOUNTER (OUTPATIENT)
Dept: PHYSICAL THERAPY | Age: 82
Discharge: HOME | End: 2017-04-14
Payer: MEDICARE

## 2017-04-14 PROCEDURE — 97140 MANUAL THERAPY 1/> REGIONS: CPT

## 2017-04-14 PROCEDURE — G8988 SELF CARE GOAL STATUS: HCPCS

## 2017-04-14 PROCEDURE — G8989 SELF CARE D/C STATUS: HCPCS

## 2017-04-14 NOTE — PROGRESS NOTES
PHYSICAL THERAPY - DAILY TREATMENT NOTE    Patient Name: Jan Andrews        Date: 2017  : 1929   YES Patient  Verified  Visit #:   14   of   15  Insurance: Payor: [place] [employer] / Plan: [place] MED[place]ARE PART A & B / Product Type: Medicare /      In time: 0 Out time: 255   Total Treatment Time: 25       TREATMENT AREA = Right temporomandibular joint disorder, unspecified [M26.601]    SUBJECTIVE  Pain Level (on 0 to 10 scale):  0  / 10   Medication Changes/New allergies or changes in medical history, any new surgeries or procedures? NO    If yes, update Summary List   Subjective Functional Status/Changes:  []  No changes reported     Reports soreness in (R) TMD region           OBJECTIVE    25 min Manual Therapy: DTM to (R) masseter, temporalis, (R) TMD distraction, lateral deviation glide    Rationale:      decrease pain, increase ROM and increase tissue extensibility to improve patient's ability to chew foods     min Patient Education:  YES  Reviewed HEP   []  Progressed/Changed HEP based on: Other Objective/Functional Measures:    Demonstrates mandibular depression to 20mm at onset of visit.  Reports having problems with pads not sticking to device, teeth slipping off appliance     Reviewed use of thera bite appliance with patient, as well as use of embory board to help pads adhere    Demonstrates increase of mandibular depression to 30mm at conclusion of treatment      Post Treatment Pain Level (on 0 to 10) scale:   0  / 10     ASSESSMENT  Assessment/Changes in Function:     Demonstrates functional opening of TMD for chewing and eating of various foods, con't to see 1x week for 2 weeks to monitor compliance with HEP for self massage and use of therabite      []  See Progress Note/Recertification   Patient will continue to benefit from skilled PT services to modify and progress therapeutic interventions, address functional mobility deficits, address ROM deficits and address strength deficits to attain remaining goals.    Progress toward goals / Updated goals:    Progressing with overall function and ability to chew food     PLAN  []  Upgrade activities as tolerated YES Continue plan of care   []  Discharge due to :    []  Other:      Therapist: Connie BARLOWT, CIMT    Date: 4/14/2017 Time: 2:52 PM       Future Appointments  Date Time Provider Lynne Sinclair   4/20/2017 2:00 PM Driss Kaur, PT [facility]   4/27/2017 2:00 PM Driss Kaur PT [id number] [facility]

## 2017-04-20 ENCOUNTER — HOSPITAL ENCOUNTER (OUTPATIENT)
Dept: PHYSICAL THERAPY | Age: 82
Discharge: HOME | End: 2017-04-20
Payer: MEDICARE

## 2017-04-20 PROCEDURE — 97140 MANUAL THERAPY 1/> REGIONS: CPT

## 2017-04-25 NOTE — PROGRESS NOTES
[address] [place] [address], [address] [place] [address] - Phone: [phone]  Fax: [phone] FOR PHYSICAL THERAPY          Patient Name: Parminder Hastings :    Treatment/Medical Diagnosis: Right temporomandibular joint disorder, unspecified [M26.601]   Onset Date: 2016    Referral Source: Corina Howard MD [facility] [place]): 17   Prior Hospitalization: See Medical History Provider #: [id number]   Prior Level of Function: No difficulty with eating, chewing various foods, normal diet    Comorbidities: OA, Skin Cancer, D and C, Breast Reduction   Medications: Verified on Patient Summary List   Visits from [facility]: 15 Missed Visits:      Goal/Measure of Progress Goal Met? 1. Pt will havepain-free AROM mandibular depression greater than 30mm to allow ability to return to normal diet   Status at last Eval: 20mm Current Status: 30mm yes     Key Functional Changes/Progress: Demonstrates con't hypertonicity in (R) > (L) masseter that alleviates with use of Thera-Bite trismus device and soft tissue mobilization to (R) masseter and medial pterygoid. Pt has been able to return to normal diet, have dental procedure for crown placement on (R) lower molars. Prior to DC pt was instructed in home exercises program, self stretching for self maintenance of (R) TMD dysfunction. G-Codes (GP): Self Care  Goal  CK= 40-59%  [id number] D/C  CJ= 20-39%. The severity rating is based on the FOTO Score    Assessments/Recommendations: Discontinue therapy. Progressing towards or have reached established goals. If you have any questions/comments please contact us directly at [phone]. Thank you for allowing us to assist in the care of your patient.     Therapist Signature: Bradley Oliveira PT Date: 17   Reporting Period: 3/28/17-17 Time: 9:02 AM

## 2017-04-27 ENCOUNTER — APPOINTMENT (OUTPATIENT)
Dept: PHYSICAL THERAPY | Age: 82
End: 2017-04-27
Payer: MEDICARE